# Patient Record
Sex: MALE | Race: OTHER | Employment: UNEMPLOYED | URBAN - METROPOLITAN AREA
[De-identification: names, ages, dates, MRNs, and addresses within clinical notes are randomized per-mention and may not be internally consistent; named-entity substitution may affect disease eponyms.]

---

## 2022-06-27 PROBLEM — Z96.0 URINARY CATHETER IN PLACE: Status: ACTIVE | Noted: 2022-06-27

## 2022-06-27 PROBLEM — N39.0 URINARY TRACT INFECTION WITH HEMATURIA: Status: ACTIVE | Noted: 2022-06-27

## 2022-06-27 PROBLEM — R33.9 URINARY RETENTION: Status: ACTIVE | Noted: 2022-06-27

## 2022-06-27 PROBLEM — R31.9 URINARY TRACT INFECTION WITH HEMATURIA: Status: ACTIVE | Noted: 2022-06-27

## 2023-01-10 PROBLEM — N42.89 PROSTATE MASS: Status: ACTIVE | Noted: 2023-01-10

## 2023-01-10 PROBLEM — R97.20 ELEVATED PROSTATE SPECIFIC ANTIGEN (PSA): Status: ACTIVE | Noted: 2023-01-10

## 2023-01-10 NOTE — H&P (VIEW-ONLY)
[facility]  [address] [facility]    Aníbal Bustamante [address], [address] [facility]  [phone]          Christina Hernandez  : 1975    Chief Complaint   Patient presents with    Follow-up          HPI     Christina Hernandez is a 49 y.o. male followed for BPH and elevated psa. Malay speaking only. Upon chart review, pt has had 3 ER visit in  at [facility] and 1 ER visit in [place] In July for urinary retention. He was started on keflex on  for nitritie positive UA and flomax. Urine was not sent for culture. He reports he has failed 3 VT. Cr 1.06 on 22. No imaging of urinary tract on file. Pt passed VT on 22. PVR 39 cc via US in office today. UA clear. He was continued on flomax 0.4 mg BID. Doing well on this dose. ANGELA anodular prostate. Grandfather was dx w prostate Ca. He is a non smoker. MRI prostate 23 revealed PIRADS 3 lesions x 2, 1 PIRADS 4 lesion, 1 PIRADS 5 lesion. Lab Results   Component Value Date     PSA 9.4 (H) 10/27/2022     PSA 7.8 (H) 2022           Past Medical History:   Diagnosis Date    Asthma     Benign prostatic hyperplasia     Catheter (urine) change required     Migraines      Past Surgical History:   Procedure Laterality Date    CATHETER INSERTION      HERNIA REPAIR      in the testicles    TONSILLECTOMY       Current Outpatient Medications   Medication Sig Dispense Refill    ciprofloxacin (CIPRO) 500 MG tablet Take 1 tablet by mouth 2 times daily for 1 day Begin AM of prostate biopsy. 2 tablet 0    tamsulosin (FLOMAX) 0.4 MG capsule Take 1 capsule by mouth in the morning and at bedtime 180 capsule 3    cephALEXin (KEFLEX) 500 MG capsule PLEASE SEE ATTACHED FOR DETAILED DIRECTIONS (Patient not taking: No sig reported)      diazePAM (VALIUM) 5 MG tablet Take 5 mg by mouth every 8 hours as needed. (Patient not taking: No sig reported)       No current facility-administered medications for this visit.      No Known Allergies  Social History     Socioeconomic History    Marital status:      Spouse name: Not on file    Number of children: Not on file    Years of education: Not on file    Highest education level: Not on file   Occupational History    Not on file   Tobacco Use    Smoking status: Never    Smokeless tobacco: Never   Vaping Use    Vaping Use: Never used   Substance and Sexual Activity    Alcohol use: Never    Drug use: Never    Sexual activity: Not on file     Comment: did not ask   Other Topics Concern    Not on file   Social History Narrative    Not on file     Social Determinants of Health     Financial Resource Strain: Low Risk     Difficulty of Paying Living Expenses: Not hard at all   Food Insecurity: No Food Insecurity    Worried About Running Out of Food in the Last Year: Never true    Ran Out of Food in the Last Year: Never true   Transportation Needs: Not on file   Physical Activity: Not on file   Stress: Not on file   Social Connections: Not on file   Intimate Partner Violence: Not on file   Housing Stability: Unknown    Unable to Pay for Housing in the Last Year: No    Number of [place] in the Last Year: Not on file    Unstable Housing in the Last Year: No     Family History   Problem Relation Age of Onset    Pancreatic Cancer Mother     Heart Attack Father     Cancer Brother        Review of Systems  Constitutional:   Negative for fever. GI:  Negative for nausea. Genitourinary:  Negative for flank pain.     Urinalysis  UA - Dipstick  Results for orders placed or performed in visit on 01/10/23   AMB POC URINALYSIS DIP STICK AUTO W/O MICRO   Result Value Ref Range    Color (UA POC)      Clarity (UA POC)      Glucose, Urine, POC Negative Negative    Bilirubin, Urine, POC Negative Negative    KETONES, Urine, POC Negative Negative    Specific Gravity, Urine, POC 1.020 1.001 - 1.035    Blood (UA POC) Negative Negative    pH, Urine, POC 7.0 4.6 - 8.0    Protein, Urine, POC Negative Negative Urobilinogen, POC 0.2 mg/dL     Nitrite, Urine, POC Negative Negative    Leukocyte Esterase, Urine, POC Negative Negative       There were no vitals taken for this visit. GENERAL: NAD, ALERT, A&O x 3, GAIT NORMAL  CARDIAC: regular rate and rhythm  CHEST AND LUNG: Easy work of breathing, clear to auscultation bilaterally  ABDOMEN: soft, non tender, non distended, + bowel sounds, no organomegaly, no palpable masses  NEUROLOGIC: cranial nerves 2-12 grossly intact           Assessment and Plan    ICD-10-CM    1. Elevated PSA  R97.20 AMB POC URINALYSIS DIP STICK AUTO W/O MICRO     ciprofloxacin (CIPRO) 500 MG tablet      2. Benign prostatic hyperplasia with nocturia  N40.1 AMB POC URINALYSIS DIP STICK AUTO W/O MICRO    R35.1           MRI results were discussed and options reviewed. He will be scheduled for uronav guided prostate biopsy. Potential risks were discussed.

## 2023-01-24 RX ORDER — LORATADINE 10 MG/1
10 CAPSULE, LIQUID FILLED ORAL AS NEEDED
COMMUNITY

## 2023-01-24 RX ORDER — ACETAMINOPHEN, ASPIRIN AND CAFFEINE 250; 250; 65 MG/1; MG/1; MG/1
1 TABLET, FILM COATED ORAL AS NEEDED
Status: ON HOLD | COMMUNITY
End: 2023-02-01 | Stop reason: HOSPADM

## 2023-01-24 NOTE — PERIOP NOTE
Patient verified name and . Order for consent  found in EHR and matches case posting; patient verifies procedure. Completed with [facility]. Type 1 surgery, Phone assessment complete. Orders  received. Labs per surgeon: CBC and BMP DOS  Labs per anesthesia protocol: no additional.    Patient answered medical/surgical history questions at their best of ability. All prior to admission medications documented in [facility]. Patient instructed to take the following medications the day of surgery according to anesthesia guidelines with a small sip of water: Flomax. Hold all vitamins 7 days prior to surgery and NSAIDS 5 days prior to surgery. Prescription meds to hold:Excedrin - 5 days prior to surgery. Patient instructed on the following:    > Arrive at [facility], time of arrival to be called the day before by 1700  > NPO after midnight, unless otherwise indicated, including gum, mints, and ice chips  > Responsible adult must drive patient to the hospital, stay during surgery, and patient will need supervision 24 hours after anesthesia  > Use Antibacterial Soap in shower the night before surgery and on the morning of surgery  > All piercings must be removed prior to arrival.    > Leave all valuables (money and jewelry) at home but bring insurance card and ID on DOS.   > You may be required to pay a deductible or co-pay on the day of your procedure. You can pre-pay by calling [phone] if your surgery is at the [facility] or [phone] if your surgery is at the [facility]. > Do not wear make-up, nail polish, lotions, cologne, perfumes, powders, or oil on skin. Artificial nails are not permitted.

## 2023-01-25 ENCOUNTER — TELEPHONE (OUTPATIENT)
Dept: UROLOGY | Age: 48
End: 2023-01-25

## 2023-02-01 ENCOUNTER — ANESTHESIA (OUTPATIENT)
Dept: SURGERY | Age: 48
End: 2023-02-01

## 2023-02-01 ENCOUNTER — ANESTHESIA EVENT (OUTPATIENT)
Dept: SURGERY | Age: 48
End: 2023-02-01

## 2023-02-01 ENCOUNTER — HOSPITAL ENCOUNTER (OUTPATIENT)
Age: 48
Setting detail: OUTPATIENT SURGERY
Discharge: HOME | End: 2023-02-01
Attending: UROLOGY | Admitting: UROLOGY

## 2023-02-01 VITALS
DIASTOLIC BLOOD PRESSURE: 75 MMHG | TEMPERATURE: 98.2 F | HEART RATE: 69 BPM | HEIGHT: 66 IN | BODY MASS INDEX: 26.22 KG/M2 | OXYGEN SATURATION: 98 % | SYSTOLIC BLOOD PRESSURE: 118 MMHG | WEIGHT: 163.14 LBS | RESPIRATION RATE: 16 BRPM

## 2023-02-01 DIAGNOSIS — N42.89 PROSTATE MASS: ICD-10-CM

## 2023-02-01 DIAGNOSIS — R97.20 ELEVATED PROSTATE SPECIFIC ANTIGEN (PSA): ICD-10-CM

## 2023-02-01 LAB
ANION GAP SERPL CALC-SCNC: 8 MMOL/L (ref 2–11)
BUN SERPL-MCNC: 17 MG/DL (ref 6–23)
CALCIUM SERPL-MCNC: 9.5 MG/DL (ref 8.3–10.4)
CHLORIDE SERPL-SCNC: 106 MMOL/L (ref 101–110)
CO2 SERPL-SCNC: 25 MMOL/L (ref 21–32)
CREAT SERPL-MCNC: 1.4 MG/DL (ref 0.8–1.5)
ERYTHROCYTE [DISTWIDTH] IN BLOOD BY AUTOMATED COUNT: 13.2 % (ref 11.9–14.6)
GLUCOSE SERPL-MCNC: 105 MG/DL (ref 65–100)
HCT VFR BLD AUTO: 51.1 % (ref 41.1–50.3)
HGB BLD-MCNC: 17.7 G/DL (ref 13.6–17.2)
MCH RBC QN AUTO: 29.7 PG (ref 26.1–32.9)
MCHC RBC AUTO-ENTMCNC: 34.6 G/DL (ref 31.4–35)
MCV RBC AUTO: 85.9 FL (ref 82–102)
NRBC # BLD: 0 K/UL (ref 0–0.2)
PLATELET # BLD AUTO: 312 K/UL (ref 150–450)
PMV BLD AUTO: 8.9 FL (ref 9.4–12.3)
POTASSIUM SERPL-SCNC: 4.3 MMOL/L (ref 3.5–5.1)
RBC # BLD AUTO: 5.95 M/UL (ref 4.23–5.6)
SODIUM SERPL-SCNC: 139 MMOL/L (ref 133–143)
WBC # BLD AUTO: 7.7 K/UL (ref 4.3–11.1)

## 2023-02-01 PROCEDURE — 7100000001 HC PACU RECOVERY - ADDTL 15 MIN: Performed by: UROLOGY

## 2023-02-01 PROCEDURE — 2580000003 HC RX 258: Performed by: UROLOGY

## 2023-02-01 PROCEDURE — 88305 TISSUE EXAM BY PATHOLOGIST: CPT

## 2023-02-01 PROCEDURE — 2709999900 HC NON-CHARGEABLE SUPPLY: Performed by: UROLOGY

## 2023-02-01 PROCEDURE — 85027 COMPLETE CBC AUTOMATED: CPT

## 2023-02-01 PROCEDURE — 6360000002 HC RX W HCPCS: Performed by: UROLOGY

## 2023-02-01 PROCEDURE — 2580000003 HC RX 258: Performed by: ANESTHESIOLOGY

## 2023-02-01 PROCEDURE — 6360000002 HC RX W HCPCS: Performed by: NURSE ANESTHETIST, CERTIFIED REGISTERED

## 2023-02-01 PROCEDURE — 80048 BASIC METABOLIC PNL TOTAL CA: CPT

## 2023-02-01 PROCEDURE — 7100000010 HC PHASE II RECOVERY - FIRST 15 MIN: Performed by: UROLOGY

## 2023-02-01 PROCEDURE — 3700000001 HC ADD 15 MINUTES (ANESTHESIA): Performed by: UROLOGY

## 2023-02-01 PROCEDURE — 3600000012 HC SURGERY LEVEL 2 ADDTL 15MIN: Performed by: UROLOGY

## 2023-02-01 PROCEDURE — 7100000000 HC PACU RECOVERY - FIRST 15 MIN: Performed by: UROLOGY

## 2023-02-01 PROCEDURE — 3700000000 HC ANESTHESIA ATTENDED CARE: Performed by: UROLOGY

## 2023-02-01 PROCEDURE — 3600000002 HC SURGERY LEVEL 2 BASE: Performed by: UROLOGY

## 2023-02-01 RX ORDER — SODIUM CHLORIDE 9 MG/ML
INJECTION, SOLUTION INTRAVENOUS PRN
Status: DISCONTINUED | OUTPATIENT
Start: 2023-02-01 | End: 2023-02-01 | Stop reason: HOSPADM

## 2023-02-01 RX ORDER — HYDROMORPHONE HCL 110MG/55ML
0.5 PATIENT CONTROLLED ANALGESIA SYRINGE INTRAVENOUS EVERY 5 MIN PRN
Status: DISCONTINUED | OUTPATIENT
Start: 2023-02-01 | End: 2023-02-01 | Stop reason: HOSPADM

## 2023-02-01 RX ORDER — PROPOFOL 10 MG/ML
INJECTION, EMULSION INTRAVENOUS PRN
Status: DISCONTINUED | OUTPATIENT
Start: 2023-02-01 | End: 2023-02-01 | Stop reason: SDUPTHER

## 2023-02-01 RX ORDER — FENTANYL CITRATE 50 UG/ML
100 INJECTION, SOLUTION INTRAMUSCULAR; INTRAVENOUS
Status: DISCONTINUED | OUTPATIENT
Start: 2023-02-01 | End: 2023-02-01 | Stop reason: HOSPADM

## 2023-02-01 RX ORDER — MIDAZOLAM HYDROCHLORIDE 2 MG/2ML
2 INJECTION, SOLUTION INTRAMUSCULAR; INTRAVENOUS
Status: DISCONTINUED | OUTPATIENT
Start: 2023-02-01 | End: 2023-02-01 | Stop reason: HOSPADM

## 2023-02-01 RX ORDER — SODIUM CHLORIDE 0.9 % (FLUSH) 0.9 %
5-40 SYRINGE (ML) INJECTION PRN
Status: DISCONTINUED | OUTPATIENT
Start: 2023-02-01 | End: 2023-02-01 | Stop reason: HOSPADM

## 2023-02-01 RX ORDER — SODIUM CHLORIDE 0.9 % (FLUSH) 0.9 %
5-40 SYRINGE (ML) INJECTION EVERY 12 HOURS SCHEDULED
Status: DISCONTINUED | OUTPATIENT
Start: 2023-02-01 | End: 2023-02-01 | Stop reason: HOSPADM

## 2023-02-01 RX ORDER — SODIUM CHLORIDE, SODIUM LACTATE, POTASSIUM CHLORIDE, CALCIUM CHLORIDE 600; 310; 30; 20 MG/100ML; MG/100ML; MG/100ML; MG/100ML
INJECTION, SOLUTION INTRAVENOUS CONTINUOUS
Status: DISCONTINUED | OUTPATIENT
Start: 2023-02-01 | End: 2023-02-01 | Stop reason: HOSPADM

## 2023-02-01 RX ORDER — PROCHLORPERAZINE EDISYLATE 5 MG/ML
5 INJECTION INTRAMUSCULAR; INTRAVENOUS
Status: DISCONTINUED | OUTPATIENT
Start: 2023-02-01 | End: 2023-02-01 | Stop reason: HOSPADM

## 2023-02-01 RX ORDER — LIDOCAINE HYDROCHLORIDE 10 MG/ML
1 INJECTION, SOLUTION INFILTRATION; PERINEURAL
Status: DISCONTINUED | OUTPATIENT
Start: 2023-02-01 | End: 2023-02-01 | Stop reason: HOSPADM

## 2023-02-01 RX ORDER — OXYCODONE HYDROCHLORIDE 5 MG/1
5 TABLET ORAL
Status: DISCONTINUED | OUTPATIENT
Start: 2023-02-01 | End: 2023-02-01 | Stop reason: HOSPADM

## 2023-02-01 RX ADMIN — CEFTRIAXONE 1000 MG: 1 INJECTION, POWDER, FOR SOLUTION INTRAMUSCULAR; INTRAVENOUS at 10:52

## 2023-02-01 RX ADMIN — PROPOFOL 50 MG: 10 INJECTION, EMULSION INTRAVENOUS at 10:54

## 2023-02-01 RX ADMIN — PROPOFOL 50 MG: 10 INJECTION, EMULSION INTRAVENOUS at 10:53

## 2023-02-01 RX ADMIN — SODIUM CHLORIDE, POTASSIUM CHLORIDE, SODIUM LACTATE AND CALCIUM CHLORIDE: 600; 310; 30; 20 INJECTION, SOLUTION INTRAVENOUS at 10:16

## 2023-02-01 ASSESSMENT — PAIN - FUNCTIONAL ASSESSMENT: PAIN_FUNCTIONAL_ASSESSMENT: NONE - DENIES PAIN

## 2023-02-01 NOTE — PERIOP NOTE
Pt and wife given discharge instructions at bedside, both verbalize understanding. All questions answered.

## 2023-02-01 NOTE — OP NOTE
Operative Note                Patient: Alma Delia Matthew [id number]    Date of Surgery: 02/01/23    Preoperative Diagnosis: Elevated psa and prostate lesion(s) on MRI imaging    Postoperative Diagnosis:  same    Surgeon(s) and Role:     * Paulino Gonzales MD - Primary     Anesthesia:  MAC     Procedure: Procedure(s):  Bowen Child MRI fused TRUS prostate biopsy     RISKS DISCUSSION:     Discussed the risk of surgery including infection, hematoma, bleeding, and the risks of general anesthetic. The patient understands the risks, any and all questions were answered to the patient's satisfaction and they freely signed the consent for operation. URONAV MRI FUSED TRANSRECTAL ULTRASOUND GUIDED BIOPSY OF THE PROSTATE    All risks, benefits and alternatives were again reviewed and he is willing to proceed at this time. The patient was placed in the left lateral decubitus position. I then inserted the transrectal ultrasound probe into the rectum. The prostate was visualized. The prostate appeared homogenous in appearance. Ultrasonographic sweep of the prostate was performed to obtain images to link to MRI via URONAV. There were 4 regions of interest based upon MRI imaging (ROI1=midgland, ROI2=R apex anterior, ROI3=R mid gland, ROI4=R apex posterior). 5 biopsies of regions of interest were then obtained using the ultrasound images for guidance that had been linked to the previous MRI using Uronav. I then performed 6 needle core biopsies using a standard sextant biopsy format with traditional ultrasound images for guidance. The ultrasound probe was removed. The patient tolerated the procedure well.       PROSTATE VOLUME:  66 gr + prominent median lobe    Estimated Blood Loss:  minimal    Specimens: prostate biopsies             Drains: none                 Implants: * No implants in log *           Signed By: Vance Aguirre MD              Specimens:   ID Type Source Tests Collected by Time Destination A : RB Tissue Prostate SURGICAL PATHOLOGY Jonnie Fry MD 2/1/2023 1040    B : RM Tissue Prostate SURGICAL PATHOLOGY Jonnie Fry MD 2/1/2023 1040    C : RA Tissue Prostate SURGICAL PATHOLOGY Jonnie Fry MD 2/1/2023 1040    D : LB Tissue Prostate SURGICAL PATHOLOGY Jonnie Fry MD 2/1/2023 1040    E : LM Tissue Prostate SURGICAL PATHOLOGY Jonnie Fry MD 2/1/2023 1040    F : LA Tissue Prostate SURGICAL PATHOLOGY Jnonie Fry MD 2/1/2023 1040    G : YOLANDA 1-1 Tissue Prostate SURGICAL PATHOLOGY Jonnie Fry MD 2/1/2023 1041    H : YOLANDA 1-2 Tissue Prostate SURGICAL PATHOLOGY Jonnie Fry MD 2/1/2023 1041    I : YOLANDA 2-1 Tissue Prostate SURGICAL PATHOLOGY Jonnie Fry MD 2/1/2023 1041    J : YOLANDA 3-1 Tissue Prostate SURGICAL PATHOLOGY Jonnie Fry MD 2/1/2023 1041    K : YOLANDA 4-1 Tissue Prostate SURGICAL PATHOLOGY Jonnie Fry MD 2/1/2023 1042

## 2023-02-01 NOTE — ANESTHESIA PRE PROCEDURE
Department of Anesthesiology  Preprocedure Note       Name:  Bob Harmon   Age:  49 y.o.  :  1975                                          MRN:  [id number]         Date:  2023      Surgeon: Nereida Alberts):  Timothy Norwood MD    Procedure: Procedure(s):  PROSTATE BIOPSY FUSION    Medications prior to admission:   Prior to Admission medications    Medication Sig Start Date End Date Taking?  Authorizing Provider   aspirin-acetaminophen-caffeine (EXCEDRIN MIGRAINE) 955-192-81 MG per tablet Take 1 tablet by mouth as needed for Headaches   Yes Historical Provider, MD   loratadine (CLARITIN) 10 MG capsule Take 10 mg by mouth as needed   Yes Historical Provider, MD   tamsulosin (FLOMAX) 0.4 MG capsule Take 1 capsule by mouth in the morning and at bedtime 10/27/22   TRAN Hewitt - CNP       Current medications:    Current Facility-Administered Medications   Medication Dose Route Frequency Provider Last Rate Last Admin    lidocaine 1 % injection 1 mL  1 mL IntraDERmal Once PRN Adriano Ignacio MD        fentaNYL (SUBLIMAZE) injection 100 mcg  100 mcg IntraVENous Once PRN Adriano Ignacio MD        lactated ringers IV soln infusion   IntraVENous Continuous Adriano Ignacio MD        sodium chloride flush 0.9 % injection 5-40 mL  5-40 mL IntraVENous 2 times per day Adriano Ignacio MD        sodium chloride flush 0.9 % injection 5-40 mL  5-40 mL IntraVENous PRN Adriano Ignacio MD        0.9 % sodium chloride infusion   IntraVENous PRN Adriano Ignacio MD        midazolam PF (VERSED) injection 2 mg  2 mg IntraVENous Once PRN Adriano Ignacio MD        cefTRIAXone (ROCEPHIN) 1,000 mg in sodium chloride 0.9 % 50 mL IVPB (mini-bag)  1,000 mg IntraVENous On Call to [address] MD Damien           Allergies:  No Known Allergies    Problem List:    Patient Active Problem List   Diagnosis Code    Urinary catheter in place Z96.0    Urinary retention R33.9    Urinary tract infection with hematuria N39.0, R31.9    Elevated prostate specific antigen (PSA) R97.20    Prostate mass N42.89       Past Medical History:        Diagnosis Date    Asthma     no recent episodes    Benign prostatic hyperplasia     Catheter (urine) change required     pt no longer has a catheter    Elevated prostate specific antigen (PSA)     Migraines     Seasonal allergies     Uses Wolof as primary spoken language        Past Surgical History:        Procedure Laterality Date    CATHETER INSERTION  2022    placed and removed    HERNIA REPAIR Left     in the testicles    TONSILLECTOMY         Social History:    Social History     Tobacco Use    Smoking status: Never    Smokeless tobacco: Never   Substance Use Topics    Alcohol use: Never                                Counseling given: Not Answered      Vital Signs (Current):   Vitals:    01/24/23 1330 02/01/23 0939   BP:  116/75   Pulse:  74   Resp:  16   Temp:  98.2 °F (36.8 °C)   TempSrc:  Infrared   SpO2:  99%   Weight: 163 lb 2.3 oz (74 kg)    Height: 5' 6\" (1.676 m)                                               BP Readings from Last 3 Encounters:   02/01/23 116/75   09/21/22 120/78   06/27/22 106/72       NPO Status: Time of last liquid consumption: 0000                        Time of last solid consumption: 0000                        Date of last liquid consumption: 01/31/23                        Date of last solid food consumption: 01/31/23    BMI:   Wt Readings from Last 3 Encounters:   01/24/23 163 lb 2.3 oz (74 kg)   01/04/23 167 lb (75.8 kg)   09/21/22 167 lb (75.8 kg)     Body mass index is 26.33 kg/m². CBC: No results found for: WBC, RBC, HGB, HCT, MCV, RDW, PLT    CMP: No results found for: NA, K, CL, CO2, BUN, CREATININE, GFRAA, AGRATIO, LABGLOM, GLUCOSE, GLU, PROT, CALCIUM, BILITOT, ALKPHOS, AST, ALT    POC Tests: No results for input(s): POCGLU, POCNA, POCK, POCCL, POCBUN, POCHEMO, POCHCT in the last 72 hours.     Coags: No results found for: PROTIME, INR, APTT    HCG (If Applicable): No results found for: PREGTESTUR, PREGSERUM, HCG, HCGQUANT     ABGs: No results found for: PHART, PO2ART, FEP4AXD, GCV2YIQ, BEART, G1TOJFDP     Type & Screen (If Applicable):  No results found for: LABABO, LABRH    Drug/Infectious Status (If Applicable):  No results found for: HIV, HEPCAB    COVID-19 Screening (If Applicable): No results found for: COVID19        Anesthesia Evaluation    Airway: Mallampati: I  TM distance: >3 FB   Neck ROM: full  Mouth opening: > = 3 FB   Dental: normal exam         Pulmonary:normal exam  breath sounds clear to auscultation  (+) asthma (well controlled, rare sxs):                            Cardiovascular:  Exercise tolerance: good (>4 METS),   (+) dysrhythmias (occ palpitations, takes propranolol prn, no issues for 1 yr):,         Rhythm: regular  Rate: normal                    Neuro/Psych:   (+) headaches: migraine headaches,             GI/Hepatic/Renal:             Endo/Other:    (+) malignancy/cancer (prostate). Abdominal:             Vascular: Other Findings:           Anesthesia Plan      TIVA     ASA 2     (IPad .)  Induction: intravenous. Anesthetic plan and risks discussed with patient.                         Zachery Dent MD   2/1/2023

## 2023-02-01 NOTE — PROGRESS NOTES
Patient speaks Sinhala as their preferred language for their healthcare communication. If there are technical problems using the AMN mobil unit, please contact Language Services for interpretation at:    Senior Domingo -Navigator ([phone])  General phone: [phone] ( [phone])  Email: [email or web address]    Please always document the use of interpreting services (name and/or 's ID number) in your clinical notes. Our interpreters are available for team members working with limited  English proficient (LEP) patients remotely, in person (if needed for special cases), as phone or video interpreters on the AMN Mobil units.         Thank you,        Vicky GONZALEZ  Senior /Navigator

## 2023-02-01 NOTE — INTERVAL H&P NOTE
Update History & Physical    The patient's History and Physical of January 10, 2023 was reviewed with the patient and I examined the patient. There was no change. The surgical site was confirmed by the patient and me.     Plan: The risks, benefits, expected outcome, and alternative to the recommended procedure have been discussed with the patient. Patient understands and wants to proceed with the procedure.     Electronically signed by ALLAN FARR MD on 2/1/2023 at 9:26 AM

## 2023-03-14 ENCOUNTER — OFFICE VISIT (OUTPATIENT)
Dept: UROLOGY | Age: 48
End: 2023-03-14

## 2023-03-14 DIAGNOSIS — N40.1 BENIGN PROSTATIC HYPERPLASIA WITH NOCTURIA: ICD-10-CM

## 2023-03-14 DIAGNOSIS — R97.20 ELEVATED PSA: Primary | ICD-10-CM

## 2023-03-14 DIAGNOSIS — R35.1 BENIGN PROSTATIC HYPERPLASIA WITH NOCTURIA: ICD-10-CM

## 2023-03-14 LAB
BILIRUBIN, URINE, POC: NEGATIVE
BLOOD URINE, POC: NORMAL
GLUCOSE URINE, POC: NEGATIVE
KETONES, URINE, POC: NEGATIVE
LEUKOCYTE ESTERASE, URINE, POC: NEGATIVE
NITRITE, URINE, POC: NEGATIVE
PH, URINE, POC: 5.5 (ref 4.6–8)
PROTEIN,URINE, POC: NEGATIVE
SPECIFIC GRAVITY, URINE, POC: 1 (ref 1–1.03)
URINALYSIS CLARITY, POC: NORMAL
URINALYSIS COLOR, POC: NORMAL
UROBILINOGEN, POC: NORMAL

## 2023-03-14 PROCEDURE — 81003 URINALYSIS AUTO W/O SCOPE: CPT | Performed by: UROLOGY

## 2023-03-14 PROCEDURE — 99213 OFFICE O/P EST LOW 20 MIN: CPT | Performed by: UROLOGY

## 2023-03-14 ASSESSMENT — ENCOUNTER SYMPTOMS: NAUSEA: 0

## 2023-03-14 NOTE — PROGRESS NOTES
[facility]  [address] [facility] [address], [address] [facility]  [phone]          Soledad López  : 1975    Chief Complaint   Patient presents with    Follow-up          HPI     Soledad López is a 49 y.o. male followed for BPH and elevated psa. Romansh speaking only. Upon chart review, pt has had 3 ER visit in  at [facility] and 1 ER visit in [place] In July for urinary retention. He was started on keflex on  for nitritie positive UA and flomax. Urine was not sent for culture. He reports he has failed 3 VT. Cr 1.06 on 22. No imaging of urinary tract on file. Pt passed VT on 22. PVR 39 cc via US in office today. UA clear. He was continued on flomax 0.4 mg BID. Doing well on this dose. ANGELA anodular prostate. Grandfather was dx w prostate Ca. He is a non smoker. MRI prostate 23 revealed PIRADS 3 lesions x 2, 1 PIRADS 4 lesion, 1 PIRADS 5 lesion. Helyn Spurling guided prostate biopsy 23 revealed NO prostate cancer. All cores were benign. Gland was 66 gr with a median lobe.             Past Medical History:   Diagnosis Date    Asthma     no recent episodes    Benign prostatic hyperplasia     Catheter (urine) change required     pt no longer has a catheter    Elevated prostate specific antigen (PSA)     Migraines     Seasonal allergies     Uses Romansh as primary spoken language      Past Surgical History:   Procedure Laterality Date    CATHETER INSERTION      placed and removed    HERNIA REPAIR Left     in the testicles    PROSTATE BIOPSY N/A 2023    PROSTATE BIOPSY FUSION performed by Bryson Hart MD at [facility] MAIN OR    TONSILLECTOMY       Current Outpatient Medications   Medication Sig Dispense Refill    loratadine (CLARITIN) 10 MG capsule Take 10 mg by mouth as needed      tamsulosin (FLOMAX) 0.4 MG capsule Take 1 capsule by mouth in the morning and at bedtime 180 capsule 3     No current facility-administered medications for this visit. No Known Allergies  Social History     Socioeconomic History    Marital status:      Spouse name: Not on file    Number of children: Not on file    Years of education: Not on file    Highest education level: Not on file   Occupational History    Not on file   Tobacco Use    Smoking status: Never    Smokeless tobacco: Never   Vaping Use    Vaping Use: Never used   Substance and Sexual Activity    Alcohol use: Never    Drug use: Never    Sexual activity: Not on file     Comment: did not ask   Other Topics Concern    Not on file   Social History Narrative    Not on file     Social Determinants of Health     Financial Resource Strain: Low Risk     Difficulty of Paying Living Expenses: Not hard at all   Food Insecurity: No Food Insecurity    Worried About Running Out of Food in the Last Year: Never true    Ran Out of Food in the Last Year: Never true   Transportation Needs: Not on file   Physical Activity: Not on file   Stress: Not on file   Social Connections: Not on file   Intimate Partner Violence: Not on file   Housing Stability: Unknown    Unable to Pay for Housing in the Last Year: No    Number of [place] in the Last Year: Not on file    Unstable Housing in the Last Year: No     Family History   Problem Relation Age of Onset    Pancreatic Cancer Mother     Heart Attack Father     Cancer Brother        Review of Systems  Constitutional:   Negative for fever. GI:  Negative for nausea. Genitourinary:  Negative for flank pain.     Urinalysis  UA - Dipstick  Results for orders placed or performed in visit on 03/14/23   AMB POC URINALYSIS DIP STICK AUTO W/O MICRO   Result Value Ref Range    Color (UA POC)      Clarity (UA POC)      Glucose, Urine, POC Negative Negative    Bilirubin, Urine, POC Negative Negative    KETONES, Urine, POC Negative Negative    Specific Gravity, Urine, POC 1.005 1.001 - 1.035    Blood (UA POC) Trace-intact Negative    pH, Urine, POC 5.5 4.6 - 8.0    Protein, Urine, POC Negative Negative    Urobilinogen, POC 0.2 mg/dL     Nitrite, Urine, POC Negative Negative    Leukocyte Esterase, Urine, POC Negative Negative       There were no vitals taken for this visit. GENERAL: NAD, ALERT, A&O x 3, GAIT NORMAL  LUNGS: easy work of breathing  ABDOMEN: soft, non tender  NEUROLOGIC: cranial nerves 2-12 grossly intact           Assessment and Plan    ICD-10-CM    1. Elevated PSA  R97.20 AMB POC URINALYSIS DIP STICK AUTO W/O MICRO     PSA, Diagnostic      2. Benign prostatic hyperplasia with nocturia  N40.1 AMB POC URINALYSIS DIP STICK AUTO W/O MICRO    R35.1           Biopsy results were reviewed via . I will see him back in 3 mo with psa. I wonder if had an infection at the end of '22 as LUTS have improved since. IF psa doesn't fall, we may consider finasteride considering the large size of his gland.

## 2023-06-06 DIAGNOSIS — R97.20 ELEVATED PSA: ICD-10-CM

## 2023-06-07 LAB — PSA SERPL-MCNC: 9 NG/ML

## 2023-06-21 ENCOUNTER — TELEPHONE (OUTPATIENT)
Dept: PRIMARY CARE CLINIC | Facility: CLINIC | Age: 48
End: 2023-06-21

## 2023-06-22 ENCOUNTER — OFFICE VISIT (OUTPATIENT)
Dept: PRIMARY CARE CLINIC | Facility: CLINIC | Age: 48
End: 2023-06-22

## 2023-06-22 VITALS
WEIGHT: 169 LBS | RESPIRATION RATE: 18 BRPM | HEART RATE: 74 BPM | SYSTOLIC BLOOD PRESSURE: 131 MMHG | BODY MASS INDEX: 27.16 KG/M2 | TEMPERATURE: 98.3 F | DIASTOLIC BLOOD PRESSURE: 82 MMHG | OXYGEN SATURATION: 100 % | HEIGHT: 66 IN

## 2023-06-22 DIAGNOSIS — G43.719 INTRACTABLE CHRONIC MIGRAINE WITHOUT AURA AND WITHOUT STATUS MIGRAINOSUS: Primary | ICD-10-CM

## 2023-06-22 DIAGNOSIS — H61.21 IMPACTED CERUMEN OF RIGHT EAR: ICD-10-CM

## 2023-06-22 PROCEDURE — 99213 OFFICE O/P EST LOW 20 MIN: CPT | Performed by: NURSE PRACTITIONER

## 2023-06-22 PROCEDURE — 69210 REMOVE IMPACTED EAR WAX UNI: CPT | Performed by: NURSE PRACTITIONER

## 2023-06-22 RX ORDER — PROPRANOLOL HCL 60 MG
60 CAPSULE, EXTENDED RELEASE 24HR ORAL DAILY
Qty: 90 CAPSULE | Refills: 0 | Status: SHIPPED | OUTPATIENT
Start: 2023-06-22

## 2023-06-22 RX ORDER — ONDANSETRON 4 MG/1
4 TABLET, ORALLY DISINTEGRATING ORAL 3 TIMES DAILY PRN
Qty: 21 TABLET | Refills: 0 | Status: SHIPPED | OUTPATIENT
Start: 2023-06-22

## 2023-06-22 SDOH — ECONOMIC STABILITY: INCOME INSECURITY: HOW HARD IS IT FOR YOU TO PAY FOR THE VERY BASICS LIKE FOOD, HOUSING, MEDICAL CARE, AND HEATING?: NOT HARD AT ALL

## 2023-06-22 SDOH — ECONOMIC STABILITY: FOOD INSECURITY: WITHIN THE PAST 12 MONTHS, THE FOOD YOU BOUGHT JUST DIDN'T LAST AND YOU DIDN'T HAVE MONEY TO GET MORE.: NEVER TRUE

## 2023-06-22 SDOH — ECONOMIC STABILITY: HOUSING INSECURITY
IN THE LAST 12 MONTHS, WAS THERE A TIME WHEN YOU DID NOT HAVE A STEADY PLACE TO SLEEP OR SLEPT IN A SHELTER (INCLUDING NOW)?: NO

## 2023-06-22 SDOH — ECONOMIC STABILITY: FOOD INSECURITY: WITHIN THE PAST 12 MONTHS, YOU WORRIED THAT YOUR FOOD WOULD RUN OUT BEFORE YOU GOT MONEY TO BUY MORE.: NEVER TRUE

## 2023-06-22 ASSESSMENT — PATIENT HEALTH QUESTIONNAIRE - PHQ9
1. LITTLE INTEREST OR PLEASURE IN DOING THINGS: 0
SUM OF ALL RESPONSES TO PHQ QUESTIONS 1-9: 0
SUM OF ALL RESPONSES TO PHQ9 QUESTIONS 1 & 2: 0
2. FEELING DOWN, DEPRESSED OR HOPELESS: 0
SUM OF ALL RESPONSES TO PHQ QUESTIONS 1-9: 0

## 2023-06-22 ASSESSMENT — ENCOUNTER SYMPTOMS
NAUSEA: 1
PHOTOPHOBIA: 1
ABDOMINAL PAIN: 0
SINUS PRESSURE: 0
BLURRED VISION: 0
SHORTNESS OF BREATH: 0
EYE PAIN: 0
VOMITING: 1

## 2023-06-22 NOTE — CONSULTS
Session ID: [id number]  Request ID: [id number]  Language: Kinyarwanda  Status: Fulfilled   ID: #[id number]   Name: Chio Aaron

## 2023-06-22 NOTE — PROGRESS NOTES
Awais Aquino (: 1975) Interpretation provided by [facility] Veronica Perdomo #[id number]    Pt presents today for migraine HA. Pt states has had migraines since childhood. Had seen a MD in [facility] for this issue and was given rx for Codeine/acetaminophen or Ergot. States has HA everyday, never goes away. There are things that increase HA but states that he has gotten rid of these things that make the HA worse. Has been taking these medications but HA does not fully dissipate, HA does improve. HA occurs on left or right side of head. States w/ vomiting pain increases. The last 3 months the episodes have increased. Has stopped eating sweets. With migraine pain increases to 10. Ran out of medications for acute migraine 8 days ago. Is currently taking Propranolol 40mg every other day. Had CT head 2 yrs ago w/ nml results per pt. Pt was able to pull up results from CT 21: no lesions, aneurysm, or atherosclerosis, stenosis, occlusion. Has tried NSAIDs, acetaminophen, and Triptans without relief. Migraine   The problem occurs daily. The pain quality is similar to prior headaches. The pain is at a severity of 6/10. Associated symptoms include nausea, phonophobia, photophobia and vomiting (vomitting occurs \"only occassionally\"). Pertinent negatives include no abdominal pain, blurred vision, ear pain, eye pain, fever, hearing loss, loss of balance, numbness, sinus pressure, tingling or tinnitus. He has tried NSAIDs for the symptoms. His past medical history is significant for migraine headaches. There is no history of cancer, hypertension or recent head traumas.       Chief Complaint   Patient presents with    Migraine     Patient states that he's been having migrainse since he was little, he used to take prescriptions in his country [facility] but when he came here he was able to control the headaches with excedrin, tylenol and advil for migraines but now those haven't help recently        Reviewed and updated

## 2023-07-24 ENCOUNTER — OFFICE VISIT (OUTPATIENT)
Dept: PRIMARY CARE CLINIC | Facility: CLINIC | Age: 48
End: 2023-07-24

## 2023-07-24 VITALS
SYSTOLIC BLOOD PRESSURE: 116 MMHG | HEART RATE: 85 BPM | OXYGEN SATURATION: 98 % | DIASTOLIC BLOOD PRESSURE: 74 MMHG | WEIGHT: 168 LBS | BODY MASS INDEX: 27 KG/M2 | RESPIRATION RATE: 18 BRPM | TEMPERATURE: 98.3 F | HEIGHT: 66 IN

## 2023-07-24 DIAGNOSIS — Z59.89 UNINSURED: ICD-10-CM

## 2023-07-24 DIAGNOSIS — G43.719 INTRACTABLE CHRONIC MIGRAINE WITHOUT AURA AND WITHOUT STATUS MIGRAINOSUS: Primary | ICD-10-CM

## 2023-07-24 PROCEDURE — 99214 OFFICE O/P EST MOD 30 MIN: CPT | Performed by: NURSE PRACTITIONER

## 2023-07-24 RX ORDER — BUTALBITAL, ACETAMINOPHEN AND CAFFEINE 50; 325; 40 MG/1; MG/1; MG/1
1 TABLET ORAL EVERY 4 HOURS PRN
Qty: 30 TABLET | Refills: 0 | Status: SHIPPED | OUTPATIENT
Start: 2023-07-24

## 2023-07-24 SDOH — ECONOMIC STABILITY - INCOME SECURITY: OTHER PROBLEMS RELATED TO HOUSING AND ECONOMIC CIRCUMSTANCES: Z59.89

## 2023-07-24 ASSESSMENT — ENCOUNTER SYMPTOMS
COUGH: 0
EYE PAIN: 0
SINUS PAIN: 0
CONSTIPATION: 0
RHINORRHEA: 0
SHORTNESS OF BREATH: 0
ABDOMINAL PAIN: 0
NAUSEA: 1
DIARRHEA: 0
VOMITING: 0
SORE THROAT: 0
BACK PAIN: 0

## 2023-07-24 NOTE — PROGRESS NOTES
[facility]  Tel# [phone]  Fax# [phone]       Lili Mendez, [place], [facility]  Family Nurse Practitioner            Date of Visit: 2023     Harvey Cormier (: 1975) is a 51 y.o. male  established patient, here for evaluation of the following chief complaint(s):    Chief Complaint   Patient presents with    Follow-up     Patient presents for follow up on his migraines, still continue migraine headaches constantly, Patient states that propranolol doesn't work for him would like to see if med can be changed. Patient Care Team:  TRAN Yang NP as PCP - General (Nurse Practitioner)  TRAN Yang NP as PCP - Empaneled Provider         History of Present Illness    [address] #[id number]        Presents here for follow up on headaches. Headache/Migraine  Chronic  Started in childhood, seeing a doctor in [place]. LOV with PCP Evalene Severin, CNP on 2023, pt was prescribed Ergotamine  mg SL prn, propranolol 60 mg ER 1 cap oral daily,and ondansetropn 4 mg oral as needed for nausea. Pt states he has been taking Propranolol 60 mg. Denies taking Ergotamine, denies recognizing the name. Frequency: states he has migraine every day lasting all day. States the only medication that has helped is strong pain killers. States medications here  in the [address] has not helped. In [place], meds called Fencafen (caffeine, ergotamine) and Nodol Forte (acetaminiphen 325 mg + codeine 30 mg)      Diet: B - bread, arepa (bread with cheese)          L - pork, rice, or chicken, veges, soup, or beef          D - bread, mostly small portion like a snack  Fluid intake: 2L of water           HCM    /Eye exam: due, 2022 here in the [address] There is no immunization history on file for this patient.          Patient Active Problem List   Diagnosis    Urinary catheter in place    Urinary retention    Urinary tract infection

## 2023-08-07 ENCOUNTER — OFFICE VISIT (OUTPATIENT)
Dept: PRIMARY CARE CLINIC | Facility: CLINIC | Age: 48
End: 2023-08-07

## 2023-08-07 VITALS
WEIGHT: 171 LBS | HEIGHT: 66 IN | SYSTOLIC BLOOD PRESSURE: 130 MMHG | DIASTOLIC BLOOD PRESSURE: 82 MMHG | TEMPERATURE: 98.9 F | OXYGEN SATURATION: 96 % | HEART RATE: 88 BPM | BODY MASS INDEX: 27.48 KG/M2 | RESPIRATION RATE: 18 BRPM

## 2023-08-07 DIAGNOSIS — G43.719 INTRACTABLE CHRONIC MIGRAINE WITHOUT AURA AND WITHOUT STATUS MIGRAINOSUS: Primary | ICD-10-CM

## 2023-08-07 PROBLEM — Z96.0 URINARY CATHETER IN PLACE: Status: RESOLVED | Noted: 2022-06-27 | Resolved: 2023-08-07

## 2023-08-07 PROBLEM — R31.9 URINARY TRACT INFECTION WITH HEMATURIA: Status: RESOLVED | Noted: 2022-06-27 | Resolved: 2023-08-07

## 2023-08-07 PROBLEM — N39.0 URINARY TRACT INFECTION WITH HEMATURIA: Status: RESOLVED | Noted: 2022-06-27 | Resolved: 2023-08-07

## 2023-08-07 PROCEDURE — 99213 OFFICE O/P EST LOW 20 MIN: CPT | Performed by: NURSE PRACTITIONER

## 2023-08-07 RX ORDER — PROPRANOLOL HYDROCHLORIDE 80 MG/1
80 CAPSULE, EXTENDED RELEASE ORAL DAILY
Qty: 30 CAPSULE | Refills: 1 | Status: SHIPPED | OUTPATIENT
Start: 2023-08-07

## 2023-08-07 RX ORDER — BUTALBITAL, ACETAMINOPHEN AND CAFFEINE 50; 325; 40 MG/1; MG/1; MG/1
1 TABLET ORAL EVERY 4 HOURS PRN
Qty: 30 TABLET | Refills: 0 | Status: SHIPPED | OUTPATIENT
Start: 2023-08-07

## 2023-08-07 ASSESSMENT — ENCOUNTER SYMPTOMS
VISUAL CHANGE: 0
BLURRED VISION: 0
ABDOMINAL PAIN: 0
NAUSEA: 1
SHORTNESS OF BREATH: 1
COUGH: 0
EYE PAIN: 0
VOMITING: 0
PHOTOPHOBIA: 1
EYE REDNESS: 0

## 2023-08-07 NOTE — PROGRESS NOTES
Génesislady Nolan (: 1975) Interpretation provided by [facility] Marie Gamez #[id number]     Has been taking Fioricet that was prescribed last ov and states that this has helped. States the Fioricet calms down the symptoms but does not completely make the symptoms go away. Taking Fioricet in the evening, every day. Gets relief w/ Fioricet for 3-4 hrs. Has migraines every day, some days are worse than others. Rates pain 5-6 out of 10. Has had to use Zofran twice and nausea resolved. Using mask over nose and mouth to help adjust. This yr migraines have been worse than last yr. Migraine   This is a chronic problem. The problem has been gradually improving. The pain is located in the Bilateral region. Radiates to: radiates to the back of the head. The pain quality is similar to prior headaches. The pain is at a severity of 5/10. Associated symptoms include nausea, phonophobia and photophobia. Pertinent negatives include no abdominal pain, blurred vision, coughing, dizziness, ear pain, eye pain, eye redness, fever, hearing loss, loss of balance, tinnitus, visual change, vomiting, weakness or weight loss. Exacerbated by: chocolate, salsas. His past medical history is significant for migraine headaches. There is no history of recent head traumas. Chief Complaint   Patient presents with    Follow-up     Patient presents for a follow up, states that he's been taking fioricet, it helps him a little, eases the pain but it won't go away completely, it makes feel sleepy. Pt also taking otc combination aspirin, tylenol and caffeine         Reviewed and updated this visit by provider:  Tobacco  Allergies  Meds  Problems  Med Hx  Surg Hx  Fam Hx           Immunizations:  Immunization status: up to date and documented. Review of Systems:   Review of Systems   Constitutional:  Negative for chills, fever and weight loss. HENT:  Negative for ear pain, hearing loss and tinnitus.     Eyes:  Positive for

## 2023-09-07 ENCOUNTER — OFFICE VISIT (OUTPATIENT)
Dept: PRIMARY CARE CLINIC | Facility: CLINIC | Age: 48
End: 2023-09-07

## 2023-09-07 VITALS
WEIGHT: 168 LBS | TEMPERATURE: 99.1 F | DIASTOLIC BLOOD PRESSURE: 71 MMHG | OXYGEN SATURATION: 97 % | HEART RATE: 82 BPM | SYSTOLIC BLOOD PRESSURE: 114 MMHG | RESPIRATION RATE: 18 BRPM | HEIGHT: 66 IN | BODY MASS INDEX: 27 KG/M2

## 2023-09-07 DIAGNOSIS — G43.719 INTRACTABLE CHRONIC MIGRAINE WITHOUT AURA AND WITHOUT STATUS MIGRAINOSUS: Primary | ICD-10-CM

## 2023-09-07 DIAGNOSIS — N40.1 BENIGN PROSTATIC HYPERPLASIA WITH NOCTURIA: ICD-10-CM

## 2023-09-07 DIAGNOSIS — R35.1 BENIGN PROSTATIC HYPERPLASIA WITH NOCTURIA: ICD-10-CM

## 2023-09-07 DIAGNOSIS — Z59.89 UNINSURED: ICD-10-CM

## 2023-09-07 PROBLEM — Z59.71 UNINSURED: Status: ACTIVE | Noted: 2023-09-07

## 2023-09-07 PROCEDURE — 99213 OFFICE O/P EST LOW 20 MIN: CPT | Performed by: NURSE PRACTITIONER

## 2023-09-07 RX ORDER — PROPRANOLOL HYDROCHLORIDE 120 MG/1
120 CAPSULE, EXTENDED RELEASE ORAL DAILY
Qty: 30 CAPSULE | Refills: 1 | Status: SHIPPED | OUTPATIENT
Start: 2023-09-07

## 2023-09-07 RX ORDER — TAMSULOSIN HYDROCHLORIDE 0.4 MG/1
0.4 CAPSULE ORAL 2 TIMES DAILY
Qty: 180 CAPSULE | Refills: 0 | Status: SHIPPED | OUTPATIENT
Start: 2023-09-07

## 2023-09-07 RX ORDER — FINASTERIDE 5 MG/1
5 TABLET, FILM COATED ORAL DAILY
Qty: 90 TABLET | Refills: 0 | Status: SHIPPED | OUTPATIENT
Start: 2023-09-07

## 2023-09-07 RX ORDER — SUMATRIPTAN 50 MG/1
50 TABLET, FILM COATED ORAL PRN
Qty: 40 TABLET | Refills: 0 | Status: SHIPPED | OUTPATIENT
Start: 2023-09-07 | End: 2023-10-07

## 2023-09-07 SDOH — ECONOMIC STABILITY - INCOME SECURITY: OTHER PROBLEMS RELATED TO HOUSING AND ECONOMIC CIRCUMSTANCES: Z59.89

## 2023-09-07 ASSESSMENT — ENCOUNTER SYMPTOMS
SORE THROAT: 0
EYE WATERING: 0
NAUSEA: 1
VISUAL CHANGE: 0
VOMITING: 0
BLURRED VISION: 0
ABDOMINAL PAIN: 0
SINUS PRESSURE: 0
EYE PAIN: 0
PHOTOPHOBIA: 1
EYE REDNESS: 0

## 2023-09-07 NOTE — PROGRESS NOTES
contributing to the chronic migraines. Increased dose of Propranolol sent to pharmacy. Advise to not take abortive medication for migraines >10 days/month. F/u w/ neurology to consider other preventative treatments. Discussed CGRPs and Botox today but pt declines CGRP due to cost. Pt uninsured. 2. Benign prostatic hyperplasia with nocturia  -     finasteride (PROSCAR) 5 MG tablet; Take 1 tablet by mouth daily, Disp-90 tablet, R-0Normal  -     tamsulosin (FLOMAX) 0.4 MG capsule; Take 1 capsule by mouth in the morning and at bedtime, Disp-180 capsule, R-0Normal  Pt requesting refills sent to new pharmacy. F/u w/ urology as advised. 3. Uninsured    Return in about 2 weeks (around 9/21/2023).       TRAN Jones - NP

## 2023-09-21 ENCOUNTER — OFFICE VISIT (OUTPATIENT)
Dept: PRIMARY CARE CLINIC | Facility: CLINIC | Age: 48
End: 2023-09-21

## 2023-09-21 VITALS
WEIGHT: 167 LBS | TEMPERATURE: 99.1 F | HEART RATE: 98 BPM | OXYGEN SATURATION: 97 % | RESPIRATION RATE: 18 BRPM | HEIGHT: 66 IN | DIASTOLIC BLOOD PRESSURE: 74 MMHG | SYSTOLIC BLOOD PRESSURE: 119 MMHG | BODY MASS INDEX: 26.84 KG/M2

## 2023-09-21 DIAGNOSIS — G43.719 INTRACTABLE CHRONIC MIGRAINE WITHOUT AURA AND WITHOUT STATUS MIGRAINOSUS: ICD-10-CM

## 2023-09-21 PROCEDURE — 99213 OFFICE O/P EST LOW 20 MIN: CPT | Performed by: NURSE PRACTITIONER

## 2023-09-21 RX ORDER — SUMATRIPTAN 50 MG/1
50 TABLET, FILM COATED ORAL PRN
Qty: 40 TABLET | Refills: 0 | Status: SHIPPED | OUTPATIENT
Start: 2023-09-21 | End: 2023-10-21

## 2023-09-21 RX ORDER — ONDANSETRON 4 MG/1
4 TABLET, ORALLY DISINTEGRATING ORAL EVERY 12 HOURS PRN
Qty: 14 TABLET | Refills: 0 | Status: SHIPPED | OUTPATIENT
Start: 2023-09-21

## 2023-09-21 ASSESSMENT — ENCOUNTER SYMPTOMS
ABDOMINAL PAIN: 0
SHORTNESS OF BREATH: 0
NAUSEA: 1
VOMITING: 0

## 2023-09-21 NOTE — PROGRESS NOTES
or return, may repeat dose  after 2 hours. Maximum dose: 100 mg/dose; 200 mg per 24 hours. , Disp-40 tablet, R-0Normal  -     ondansetron (ZOFRAN-ODT) 4 MG disintegrating tablet; Take 1 tablet by mouth every 12 hours as needed for Nausea or Vomiting, Disp-14 tablet, R-0Normal   Discussed in detail migraine prevention strategies including magnesium, Coq10, melatonin, Riboflavin. Advise to avoid extra caffeine as pt has been taking for prevention. Advise adjunct therapy including adequate stress management, sleep, caloric intake, hydration. Discussed medication overuse headaches and need to reduce use of ergotamines which could be contributing to the HA. I advise to use Imitrex, reviewed administration. Continue Propranolol. Would like to try another preventative strategy but pt declines several options due to financial conerns. Would appreciate neurology input. Will see pt after MRI unless sooner if indicated. Return in about 2 weeks (around 10/5/2023) for follow-up.       Purvi Oseguera, TRAN - NP

## 2023-10-04 ENCOUNTER — HOSPITAL ENCOUNTER (OUTPATIENT)
Dept: MRI IMAGING | Age: 48
Discharge: HOME | End: 2023-10-07

## 2023-10-04 DIAGNOSIS — G43.719 INTRACTABLE CHRONIC MIGRAINE WITHOUT AURA AND WITHOUT STATUS MIGRAINOSUS: ICD-10-CM

## 2023-10-04 PROCEDURE — 6360000004 HC RX CONTRAST MEDICATION: Performed by: NURSE PRACTITIONER

## 2023-10-04 PROCEDURE — A9579 GAD-BASE MR CONTRAST NOS,1ML: HCPCS | Performed by: NURSE PRACTITIONER

## 2023-10-04 PROCEDURE — 70553 MRI BRAIN STEM W/O & W/DYE: CPT

## 2023-10-04 PROCEDURE — 2580000003 HC RX 258: Performed by: NURSE PRACTITIONER

## 2023-10-04 RX ORDER — SODIUM CHLORIDE 0.9 % (FLUSH) 0.9 %
20 SYRINGE (ML) INJECTION AS NEEDED
Status: DISCONTINUED | OUTPATIENT
Start: 2023-10-04 | End: 2023-10-08 | Stop reason: HOSPADM

## 2023-10-04 RX ADMIN — GADOTERIDOL 14 ML: 279.3 INJECTION, SOLUTION INTRAVENOUS at 17:41

## 2023-10-04 RX ADMIN — SODIUM CHLORIDE, PRESERVATIVE FREE 20 ML: 5 INJECTION INTRAVENOUS at 17:41

## 2023-10-19 ENCOUNTER — OFFICE VISIT (OUTPATIENT)
Dept: NEUROLOGY | Age: 48
End: 2023-10-19

## 2023-10-19 DIAGNOSIS — G43.719 INTRACTABLE CHRONIC MIGRAINE WITHOUT AURA AND WITHOUT STATUS MIGRAINOSUS: ICD-10-CM

## 2023-10-19 PROCEDURE — 99204 OFFICE O/P NEW MOD 45 MIN: CPT | Performed by: NURSE PRACTITIONER

## 2023-10-19 RX ORDER — UBROGEPANT 100 MG/1
100 TABLET ORAL PRN
Qty: 16 TABLET | Refills: 3 | Status: SHIPPED | OUTPATIENT
Start: 2023-10-19

## 2023-10-19 RX ORDER — ATOGEPANT 60 MG/1
60 TABLET ORAL DAILY
Qty: 30 TABLET | Refills: 2 | Status: SHIPPED | OUTPATIENT
Start: 2023-10-19 | End: 2023-11-18

## 2023-10-19 ASSESSMENT — ENCOUNTER SYMPTOMS
VOMITING: 1
PHOTOPHOBIA: 1
NAUSEA: 1
ALLERGIC/IMMUNOLOGIC NEGATIVE: 1

## 2023-10-19 NOTE — ASSESSMENT & PLAN NOTE
Continue propanolol. Qulipta 60 mg tablets daily. Discontinue Imitrex, ineffective. Regarding the fencafen, this should not be taken within 24 hours of Imitrex. Additionally, this is not a medication that is available in [facility]. Trial of Ubrelvy 100 mg at onset of migraine. This may be repeated in 2 hours for maximum of 2 tablets in 24 hours. Avoid topamax due to history of kidney stones. Avoid additional blood pressure medications due to being on BB.

## 2023-10-19 NOTE — PROGRESS NOTES
Myrtle Scott is a 51 y.o. male who presents with headaches. CC: Headache        [employer] #[id number]    HPI:      Headaches are located left parietal > right but with increased pain it will radiate to right parietal.  They began entire life. The current frequency of headaches: since the beginning of this year, it has increased in frequency to 2x per week but has headache every day. Duration: 4 hours but has lasted up to 2 days. Severity is severe. Quality of headaches are described as pressure. Associated symptoms: photophobia, phonophobia, on occasions nausea, rare vomiting, no dizziness, no neck pain  The headaches are exacerbated by bright lights, loud noises, candies and darker carbonated drinks (coke) but he has eliminated these. Factors that relieve the headaches are Fencafen (caffeine 100mg) and ergotamine 1mg works best. Which was prescribed in [facility]. He has been taking this almost daily. Additionally, he has a script for sumatriptan. He was unaware they cannot be taken within 24 hours of each other. Ondansetron helps with nausea. Most bothersome migraine symptoms bright lights. Previous Imaging. [facility]ri Brain 10/5/2023       Propranolol was increased by PCP with no benefit. Ondansetron helps with nausea. He has not tried SNRI, No topirimate,       He has a history of asthma and previous kidney stone. P O U N D    HEADACHE    5 / 5. P ulsatile :  yes    O nset :  yes      Unilateral :  yes        N ausea / vomit :  yes         D ebility :  yes  Positive = 3 or more. For migraine type headaches. MIGRAINE  PHOBIAS :    2 / 5.                  PHOTO :  yes                  PHONO :  yes                   OSMO :  no                     TACTILE :  no                        VESTIBULO :  no        MEDICATION TRIALS:         Prophylactics/Preventives[de-identified]   Propranolol, Amitriptyline     Abortant meds[de-identified]   Fencafan, Imitrex    Family Members with headache history: Yes,

## 2024-01-02 ENCOUNTER — OFFICE VISIT (OUTPATIENT)
Dept: UROLOGY | Age: 49
End: 2024-01-02

## 2024-01-02 DIAGNOSIS — R35.1 BENIGN PROSTATIC HYPERPLASIA WITH NOCTURIA: ICD-10-CM

## 2024-01-02 DIAGNOSIS — R97.20 ELEVATED PSA: Primary | ICD-10-CM

## 2024-01-02 DIAGNOSIS — N40.1 BENIGN PROSTATIC HYPERPLASIA WITH NOCTURIA: ICD-10-CM

## 2024-01-02 LAB
BILIRUBIN, URINE, POC: NEGATIVE
BLOOD URINE, POC: NEGATIVE
GLUCOSE URINE, POC: NEGATIVE
KETONES, URINE, POC: NEGATIVE
LEUKOCYTE ESTERASE, URINE, POC: NEGATIVE
NITRITE, URINE, POC: NEGATIVE
PH, URINE, POC: 7 (ref 4.6–8)
PROTEIN,URINE, POC: NEGATIVE
PSA SERPL-MCNC: 5.7 NG/ML
SPECIFIC GRAVITY, URINE, POC: 1.01 (ref 1–1.03)
URINALYSIS CLARITY, POC: NORMAL
URINALYSIS COLOR, POC: NORMAL
UROBILINOGEN, POC: NORMAL

## 2024-01-02 PROCEDURE — 81003 URINALYSIS AUTO W/O SCOPE: CPT | Performed by: UROLOGY

## 2024-01-02 PROCEDURE — 99213 OFFICE O/P EST LOW 20 MIN: CPT | Performed by: UROLOGY

## 2024-01-02 RX ORDER — FINASTERIDE 5 MG/1
5 TABLET, FILM COATED ORAL DAILY
Qty: 90 TABLET | Refills: 3 | Status: SHIPPED | OUTPATIENT
Start: 2024-01-02

## 2024-01-02 ASSESSMENT — ENCOUNTER SYMPTOMS: BACK PAIN: 0

## 2024-01-02 NOTE — PROGRESS NOTES
Age of Onset    Pancreatic Cancer Mother     Heart Attack Father     Cancer Brother        Review of Systems  Constitutional:   Negative for fever.  Genitourinary:  Negative for hematuria.  Musculoskeletal:  Negative for back pain.      Urinalysis  UA - Dipstick  Results for orders placed or performed in visit on 01/02/24   AMB POC URINALYSIS DIP STICK AUTO W/O MICRO   Result Value Ref Range    Color (UA POC)      Clarity (UA POC)      Glucose, Urine, POC Negative     Bilirubin, Urine, POC Negative     KETONES, Urine, POC Negative     Specific Gravity, Urine, POC 1.015 1.001 - 1.035    Blood (UA POC) Negative     pH, Urine, POC 7.0 4.6 - 8.0    Protein, Urine, POC Negative     Urobilinogen, POC 0.2 mg/dL     Nitrite, Urine, POC Negative     Leukocyte Esterase, Urine, POC Negative        There were no vitals taken for this visit.     GENERAL: NAD, ALERT, A&O x 3, GAIT NORMAL  LUNGS: easy work of breathing  ABDOMEN: soft, non tender  NEUROLOGIC: cranial nerves 2-12 grossly intact           Assessment and Plan    ICD-10-CM    1. Elevated PSA  R97.20 AMB POC URINALYSIS DIP STICK AUTO W/O MICRO     PSA, Diagnostic      2. Benign prostatic hyperplasia with nocturia  N40.1 AMB POC URINALYSIS DIP STICK AUTO W/O MICRO    R35.1 finasteride (PROSCAR) 5 MG tablet          He will continue tamsulosin bid and finasteride daily.  Pt. will follow up in 6 months with psa for ANGELA.     PSA was drawn today.    I have spent 20 minutes today reviewing previous notes, test results and face to face with the patient as well as documenting.

## 2024-01-25 ENCOUNTER — OFFICE VISIT (OUTPATIENT)
Dept: PRIMARY CARE CLINIC | Facility: CLINIC | Age: 49
End: 2024-01-25

## 2024-01-25 VITALS
HEIGHT: 70 IN | WEIGHT: 157 LBS | RESPIRATION RATE: 18 BRPM | SYSTOLIC BLOOD PRESSURE: 121 MMHG | BODY MASS INDEX: 22.48 KG/M2 | HEART RATE: 77 BPM | TEMPERATURE: 99.1 F | DIASTOLIC BLOOD PRESSURE: 72 MMHG | OXYGEN SATURATION: 98 %

## 2024-01-25 DIAGNOSIS — R11.0 NAUSEA: ICD-10-CM

## 2024-01-25 DIAGNOSIS — R19.7 DIARRHEA, UNSPECIFIED TYPE: ICD-10-CM

## 2024-01-25 DIAGNOSIS — G43.719 INTRACTABLE CHRONIC MIGRAINE WITHOUT AURA AND WITHOUT STATUS MIGRAINOSUS: ICD-10-CM

## 2024-01-25 DIAGNOSIS — K21.9 GASTROESOPHAGEAL REFLUX DISEASE, UNSPECIFIED WHETHER ESOPHAGITIS PRESENT: ICD-10-CM

## 2024-01-25 DIAGNOSIS — K59.00 CONSTIPATION, UNSPECIFIED CONSTIPATION TYPE: Primary | ICD-10-CM

## 2024-01-25 PROCEDURE — 99213 OFFICE O/P EST LOW 20 MIN: CPT | Performed by: NURSE PRACTITIONER

## 2024-01-25 RX ORDER — PANTOPRAZOLE SODIUM 20 MG/1
20 TABLET, DELAYED RELEASE ORAL
Qty: 30 TABLET | Refills: 1 | Status: SHIPPED | OUTPATIENT
Start: 2024-01-25

## 2024-01-25 RX ORDER — MAGNESIUM 30 MG
30 TABLET ORAL DAILY
COMMUNITY

## 2024-01-25 ASSESSMENT — ENCOUNTER SYMPTOMS
COUGH: 0
DIARRHEA: 1
ABDOMINAL PAIN: 0
NAUSEA: 1
VOMITING: 1
BLOOD IN STOOL: 0
HEARTBURN: 1
SORE THROAT: 0
CONSTIPATION: 1

## 2024-01-25 ASSESSMENT — PATIENT HEALTH QUESTIONNAIRE - PHQ9
SUM OF ALL RESPONSES TO PHQ QUESTIONS 1-9: 0
1. LITTLE INTEREST OR PLEASURE IN DOING THINGS: 0
SUM OF ALL RESPONSES TO PHQ QUESTIONS 1-9: 0
SUM OF ALL RESPONSES TO PHQ QUESTIONS 1-9: 0
2. FEELING DOWN, DEPRESSED OR HOPELESS: 0
SUM OF ALL RESPONSES TO PHQ QUESTIONS 1-9: 0
SUM OF ALL RESPONSES TO PHQ9 QUESTIONS 1 & 2: 0

## 2024-01-25 NOTE — PROGRESS NOTES
Ramin Helton (: 1975) Interpretation provided by [facility] #[id number]    A few days ago had constipation and then after passed began having diarrhea. Pt states he has been having trouble gaining weight. According to chat pt has lost 5 lbs since 10/4/23. Constipation began 3 months ago. Denies symptoms occurring when Quilpta or Ubrelvy was prescribed. Constipation has resolved. Still has loose stool, about 1x/week. Sometimes it will be about 3 days between bowel movements. Denies recent travel. Denies precipitating events. Denies sick contacts. States acid reflux is worse at night. Symptoms have improved. Denies pain but describes sensation as cramping. Pain does not radiate. Has stopped eating bread. Has been treating with Tums and magnesium.     Migraines are improved, pt states from the cold. Was unable to afford the Quilpta and Ubrelvy. Was given samples for 20 days that patient took but did not continue after due to cost. States he discussed w/ neurology and is planning to make a plan at his next appt 24. Taking Propranolol daily and fencafen. Has migraine every day but the intensity is less but states last migraine was this Tuesday when pt received bad news.     GERD  Has tried TUMs without relief. Has received tx in the last for GERD.    GI Problem  The primary symptoms include weight loss, nausea, vomiting and diarrhea. Primary symptoms do not include fever or abdominal pain. The illness began more than 7 days ago.   The illness is also significant for constipation. The illness does not include chills. Significant associated medical issues include GERD.   Heartburn  He complains of heartburn and nausea. He reports no abdominal pain, no chest pain, no coughing or no sore throat. Associated symptoms include weight loss. Risk factors: denies etoh use.        Chief Complaint   Patient presents with    GI Problem     Patient presents for GI issues, patient states he noticed some

## 2024-02-19 ENCOUNTER — HOSPITAL ENCOUNTER (OUTPATIENT)
Dept: GENERAL RADIOLOGY | Age: 49
Discharge: HOME | End: 2024-02-22

## 2024-02-19 DIAGNOSIS — K59.00 CONSTIPATION, UNSPECIFIED CONSTIPATION TYPE: ICD-10-CM

## 2024-02-19 DIAGNOSIS — R19.7 DIARRHEA, UNSPECIFIED TYPE: ICD-10-CM

## 2024-02-19 PROCEDURE — 74018 RADEX ABDOMEN 1 VIEW: CPT

## 2024-02-22 ENCOUNTER — OFFICE VISIT (OUTPATIENT)
Dept: PRIMARY CARE CLINIC | Facility: CLINIC | Age: 49
End: 2024-02-22

## 2024-02-22 VITALS
SYSTOLIC BLOOD PRESSURE: 119 MMHG | BODY MASS INDEX: 25.33 KG/M2 | HEIGHT: 66 IN | DIASTOLIC BLOOD PRESSURE: 82 MMHG | WEIGHT: 157.6 LBS | HEART RATE: 102 BPM | TEMPERATURE: 98.1 F | RESPIRATION RATE: 16 BRPM

## 2024-02-22 DIAGNOSIS — K59.00 CONSTIPATION, UNSPECIFIED CONSTIPATION TYPE: Primary | ICD-10-CM

## 2024-02-22 DIAGNOSIS — R11.0 NAUSEA: ICD-10-CM

## 2024-02-22 DIAGNOSIS — K21.9 GASTROESOPHAGEAL REFLUX DISEASE, UNSPECIFIED WHETHER ESOPHAGITIS PRESENT: ICD-10-CM

## 2024-02-22 DIAGNOSIS — Z12.11 SCREENING FOR COLON CANCER: ICD-10-CM

## 2024-02-22 PROCEDURE — 99213 OFFICE O/P EST LOW 20 MIN: CPT | Performed by: NURSE PRACTITIONER

## 2024-02-22 RX ORDER — ONDANSETRON 4 MG/1
4 TABLET, ORALLY DISINTEGRATING ORAL EVERY 12 HOURS PRN
Qty: 14 TABLET | Refills: 0 | Status: SHIPPED | OUTPATIENT
Start: 2024-02-22

## 2024-02-22 ASSESSMENT — ENCOUNTER SYMPTOMS
CONSTIPATION: 1
NAUSEA: 1
DIARRHEA: 0
SHORTNESS OF BREATH: 0
BLOOD IN STOOL: 0
ABDOMINAL PAIN: 0
VOMITING: 0

## 2024-02-22 NOTE — PROGRESS NOTES
Ramin Helton (: 1975) Interpretation provided by [facility] #[id number]    Patient presents for follow up today for abdominal pain and abdominal xray results. Has continued with some abdominal symptoms. Has had constipation especially worse on the weekends. Has not taken Metamucil or Protonix as pt was unsure if to take Protonix when fasting or not and Metamucil was not given from pharmacy when pt went to  medication. Taking magnesium citrate daily for past 1 month for migraine prevention. The magnesium has helped prevent constipation. Has continued to have some acid reflux. Bowel movements are occurring every day since starting magnesium, was previously every 3-4 days. Abdominal pain has resolved but now has low back pain. Back pain is \"worse when I has gastritis or constipation\". Pain occurs if pt has not had BM in 2-3 days. Denies back injury.     Abdominal Pain  This is a recurrent problem. The pain is at a severity of 0/10. Associated symptoms include constipation, frequency (at night) and nausea. Pertinent negatives include no diarrhea, dysuria, fever, hematuria or vomiting.        Chief Complaint   Patient presents with   • Follow-up     Review xray of abdomen         Reviewed and updated this visit by provider:  Tobacco  Allergies  Meds  Problems  Med Hx  Surg Hx  Fam Hx           Immunizations:  Immunization status: up to date and documented.    Review of Systems:   Review of Systems   Constitutional:  Negative for chills, fever and unexpected weight change.   Respiratory:  Negative for shortness of breath.    Cardiovascular:  Negative for chest pain.   Gastrointestinal:  Positive for constipation and nausea. Negative for abdominal pain, blood in stool, diarrhea and vomiting.   Genitourinary:  Positive for frequency (at night). Negative for dysuria, hematuria and urgency.        /82 (Site: Right Upper Arm, Position: Sitting, Cuff Size: Medium Adult)   Pulse (!)

## 2024-02-22 NOTE — PROGRESS NOTES
Patient came in for a follow up of his xray of the abdomen. When asked if he is still experiencing headaches he said that he has but not as much as before. Patient says he thinks is because he has recently suffered the loss of his favorite pet in [facility]. He also think that worrying about the loss has made him have some stomach pains due to the stress of thinking about the loss of his animal.

## 2024-05-09 ENCOUNTER — OFFICE VISIT (OUTPATIENT)
Dept: PRIMARY CARE CLINIC | Facility: CLINIC | Age: 49
End: 2024-05-09

## 2024-05-09 VITALS
OXYGEN SATURATION: 98 % | HEART RATE: 83 BPM | BODY MASS INDEX: 25.39 KG/M2 | DIASTOLIC BLOOD PRESSURE: 72 MMHG | RESPIRATION RATE: 18 BRPM | HEIGHT: 66 IN | TEMPERATURE: 98.7 F | SYSTOLIC BLOOD PRESSURE: 121 MMHG | WEIGHT: 158 LBS

## 2024-05-09 DIAGNOSIS — B35.9 TINEA: ICD-10-CM

## 2024-05-09 DIAGNOSIS — B35.1 ONYCHOMYCOSIS: ICD-10-CM

## 2024-05-09 DIAGNOSIS — K21.9 GASTROESOPHAGEAL REFLUX DISEASE, UNSPECIFIED WHETHER ESOPHAGITIS PRESENT: ICD-10-CM

## 2024-05-09 DIAGNOSIS — R63.4 WEIGHT LOSS, UNINTENTIONAL: Primary | ICD-10-CM

## 2024-05-09 PROCEDURE — 99214 OFFICE O/P EST MOD 30 MIN: CPT | Performed by: NURSE PRACTITIONER

## 2024-05-09 RX ORDER — CLOTRIMAZOLE 1 %
CREAM (GRAM) TOPICAL
Qty: 60 G | Refills: 0 | Status: SHIPPED | OUTPATIENT
Start: 2024-05-09 | End: 2024-05-16

## 2024-05-09 RX ORDER — CLOTRIMAZOLE 1 %
CREAM (GRAM) TOPICAL
Qty: 60 G | Refills: 1 | Status: SHIPPED | OUTPATIENT
Start: 2024-05-09 | End: 2024-05-09

## 2024-05-09 RX ORDER — PANTOPRAZOLE SODIUM 20 MG/1
20 TABLET, DELAYED RELEASE ORAL
Qty: 30 TABLET | Refills: 1 | Status: SHIPPED | OUTPATIENT
Start: 2024-05-09

## 2024-05-09 RX ORDER — CICLOPIROX 80 MG/ML
SOLUTION TOPICAL
Qty: 1 EACH | Refills: 3 | Status: SHIPPED | OUTPATIENT
Start: 2024-05-09

## 2024-05-09 ASSESSMENT — ENCOUNTER SYMPTOMS
VOMITING: 0
ABDOMINAL PAIN: 0
CONSTIPATION: 1
BLOOD IN STOOL: 0
NAUSEA: 0
SHORTNESS OF BREATH: 1
DIARRHEA: 0
COUGH: 0
WHEEZING: 0

## 2024-05-09 NOTE — PROGRESS NOTES
Ramin Helton (:  1975) is a 48 y.o. male here for evaluation of the following chief complaint(s):  Chief Complaint   Patient presents with    Weight Management     Patient presents for weigh management, pt states he's been having difficulty gaining weight.  Hematoma in the in both legs, he's not sure why they appear, doesn't hurt but itches, he uses hydrocortisone spray 1% but it doesn't help much, it appeared about months ago    Medication Refill     Patient needs rf on Pantoprazole        Reviewed and updated this visit by provider:  Tobacco  Allergies  Meds  Problems  Med Hx  Surg Hx  Fam Hx         Interpretation provided by [facility] #[id number]    Patient presents today for weight gain, patient desires to gain weight. Today weight is 158lbs (71.7 kg.) Weight was 168 lbs in 2023 upon chart review. Has been using protein supplementation the last 2 months. Has been eating more eggs. States his normal weight is 80 kg. Lifts a lot of heavy things and walks a lot for work, works as a . Diet recall - Dinner= rice with eggs and tea, breakfast= arepa, lunch= rice, potatoes, and salad. Nausea has resolved.    Hematoma   2 years ago had an infection patient stated he had an infection from some clothes he treated with an antifungal and it went away. This came back 2 months ago. Treating with hydrocortisone 1% spray. Area is itchy. Began on right leg and now has symptoms on the left leg. Denies pain. Does have t    Weight Management  Pertinent negatives include no abdominal pain, chest pain, chills, coughing, fever, nausea or vomiting.   Medication Refill  Pertinent negatives include no abdominal pain, chest pain, chills, coughing, fever, nausea or vomiting.        Immunizations:  Immunization status: up to date and documented.    Review of Systems:   Review of Systems   Constitutional:  Negative for chills and fever.   Respiratory:  Positive for shortness of breath (with allergies

## 2024-07-11 ENCOUNTER — TELEPHONE (OUTPATIENT)
Dept: UROLOGY | Age: 49
End: 2024-07-11

## 2024-07-11 DIAGNOSIS — R35.1 BENIGN PROSTATIC HYPERPLASIA WITH NOCTURIA: ICD-10-CM

## 2024-07-11 DIAGNOSIS — N40.1 BENIGN PROSTATIC HYPERPLASIA WITH NOCTURIA: ICD-10-CM

## 2024-07-11 RX ORDER — TAMSULOSIN HYDROCHLORIDE 0.4 MG/1
0.4 CAPSULE ORAL 2 TIMES DAILY
Qty: 180 CAPSULE | Refills: 0 | Status: SHIPPED | OUTPATIENT
Start: 2024-07-11

## 2024-10-18 ENCOUNTER — TELEPHONE (OUTPATIENT)
Dept: UROLOGY | Age: 49
End: 2024-10-18

## 2024-10-18 NOTE — TELEPHONE ENCOUNTER
Pt called in requesting to reschedule from 10/29/2024 to 01/09/2024 due to issues with medication.

## 2025-01-02 ENCOUNTER — LAB (OUTPATIENT)
Dept: UROLOGY | Age: 50
End: 2025-01-02

## 2025-01-02 DIAGNOSIS — R97.20 ELEVATED PSA: ICD-10-CM

## 2025-01-02 LAB — PSA SERPL-MCNC: 8.2 NG/ML (ref 0–4)

## 2025-01-09 ENCOUNTER — OFFICE VISIT (OUTPATIENT)
Dept: UROLOGY | Age: 50
End: 2025-01-09

## 2025-01-09 DIAGNOSIS — R35.1 BENIGN PROSTATIC HYPERPLASIA WITH NOCTURIA: Primary | ICD-10-CM

## 2025-01-09 DIAGNOSIS — R97.20 ELEVATED PSA: ICD-10-CM

## 2025-01-09 DIAGNOSIS — N40.1 BENIGN PROSTATIC HYPERPLASIA WITH NOCTURIA: Primary | ICD-10-CM

## 2025-01-09 LAB
BILIRUBIN, URINE, POC: NEGATIVE
BLOOD URINE, POC: NORMAL
GLUCOSE URINE, POC: NEGATIVE MG/DL
KETONES, URINE, POC: NEGATIVE MG/DL
LEUKOCYTE ESTERASE, URINE, POC: NEGATIVE
NITRITE, URINE, POC: NEGATIVE
PH, URINE, POC: 6 (ref 4.6–8)
PROTEIN,URINE, POC: NORMAL MG/DL
SPECIFIC GRAVITY, URINE, POC: 1.02 (ref 1–1.03)
URINALYSIS CLARITY, POC: NORMAL
URINALYSIS COLOR, POC: NORMAL
UROBILINOGEN, POC: NORMAL MG/DL

## 2025-01-09 PROCEDURE — 99214 OFFICE O/P EST MOD 30 MIN: CPT | Performed by: UROLOGY

## 2025-01-09 PROCEDURE — 81003 URINALYSIS AUTO W/O SCOPE: CPT | Performed by: UROLOGY

## 2025-01-09 RX ORDER — FINASTERIDE 5 MG/1
5 TABLET, FILM COATED ORAL DAILY
Qty: 90 TABLET | Refills: 3 | Status: CANCELLED | OUTPATIENT
Start: 2025-01-09

## 2025-01-09 RX ORDER — TAMSULOSIN HYDROCHLORIDE 0.4 MG/1
0.4 CAPSULE ORAL 2 TIMES DAILY
Qty: 180 CAPSULE | Refills: 0 | Status: CANCELLED | OUTPATIENT
Start: 2025-01-09

## 2025-01-09 RX ORDER — FINASTERIDE 5 MG/1
5 TABLET, FILM COATED ORAL DAILY
Qty: 90 TABLET | Refills: 3 | Status: SHIPPED | OUTPATIENT
Start: 2025-01-09

## 2025-01-09 RX ORDER — ALFUZOSIN HYDROCHLORIDE 10 MG/1
10 TABLET, EXTENDED RELEASE ORAL DAILY
Qty: 30 TABLET | Refills: 3 | Status: SHIPPED | OUTPATIENT
Start: 2025-01-09

## 2025-01-09 ASSESSMENT — ENCOUNTER SYMPTOMS: BACK PAIN: 0

## 2025-01-09 NOTE — PROGRESS NOTES
[facility]  [address]  [place] [address]  [phone]          Ramin Helton  : 1975    Chief Complaint   Patient presents with    Follow-up     yearly          HPI     Ramin Helton is a 49 y.o. male followed for BPH and elevated psa.  Micronesian speaking only.   used today.       Upon chart review, pt has had 3 ER visit in  at [facility] and 1 ER visit in [place] In July for urinary retention. He was started on keflex on  for nitritie positive UA and flomax.  Urine was not sent for culture. He reports he has failed 3 VT. Cr 1.06 on 22. No imaging of urinary tract on file.     Pt passed VT on 22.  PVR 39 cc via US in office today. UA clear. He was continued on flomax 0.4 mg BID.      Grandfather was dx w prostate Ca. He is a non smoker.      MRI prostate 23 revealed PIRADS 3 lesions x 2, 1 PIRADS 4 lesion, 1 PIRADS 5 lesion.  Uronav guided prostate biopsy 23 revealed NO prostate cancer.  All cores were benign.  Gland was 66 gr with a median lobe.       Finasteride was started in  and LUTS have improved.      PSA:  7.8, 10/22 9.4,  9.0,  5.7,  8.2          Past Medical History:   Diagnosis Date    Asthma     no recent episodes    Benign prostatic hyperplasia     Catheter (urine) change required     pt no longer has a catheter    Elevated prostate specific antigen (PSA)     Migraines     Seasonal allergies     Uses Micronesian as primary spoken language      Past Surgical History:   Procedure Laterality Date    CATHETER INSERTION      placed and removed    HERNIA REPAIR Left     in the testicles    PROSTATE BIOPSY N/A 2023    PROSTATE BIOPSY FUSION performed by Ayden Up MD at [facility] MAIN OR    TONSILLECTOMY       Current Outpatient Medications   Medication Sig Dispense Refill    finasteride (PROSCAR) 5 MG tablet Take 1 tablet by mouth daily 90 tablet 3    alfuzosin

## 2025-02-10 DIAGNOSIS — R35.1 BENIGN PROSTATIC HYPERPLASIA WITH NOCTURIA: ICD-10-CM

## 2025-02-10 DIAGNOSIS — N40.1 BENIGN PROSTATIC HYPERPLASIA WITH NOCTURIA: ICD-10-CM

## 2025-02-10 RX ORDER — ALFUZOSIN HYDROCHLORIDE 10 MG/1
10 TABLET, EXTENDED RELEASE ORAL DAILY
Qty: 30 TABLET | Refills: 3 | Status: SHIPPED | OUTPATIENT
Start: 2025-02-10

## 2025-05-13 DIAGNOSIS — N40.1 BENIGN PROSTATIC HYPERPLASIA WITH NOCTURIA: ICD-10-CM

## 2025-05-13 DIAGNOSIS — R35.1 BENIGN PROSTATIC HYPERPLASIA WITH NOCTURIA: ICD-10-CM

## 2025-05-13 RX ORDER — FINASTERIDE 5 MG/1
5 TABLET, FILM COATED ORAL DAILY
Qty: 90 TABLET | Refills: 3 | OUTPATIENT
Start: 2025-05-13

## 2025-05-13 RX ORDER — ALFUZOSIN HYDROCHLORIDE 10 MG/1
10 TABLET, EXTENDED RELEASE ORAL DAILY
Qty: 30 TABLET | Refills: 3 | OUTPATIENT
Start: 2025-05-13

## 2025-07-07 ENCOUNTER — LAB (OUTPATIENT)
Dept: UROLOGY | Age: 50
End: 2025-07-07

## 2025-07-07 DIAGNOSIS — R97.20 ELEVATED PSA: ICD-10-CM

## 2025-07-07 LAB — PSA SERPL-MCNC: 5.7 NG/ML (ref 0–4)

## 2025-07-14 ENCOUNTER — OFFICE VISIT (OUTPATIENT)
Dept: UROLOGY | Age: 50
End: 2025-07-14

## 2025-07-14 DIAGNOSIS — N40.1 BENIGN PROSTATIC HYPERPLASIA WITH NOCTURIA: Primary | ICD-10-CM

## 2025-07-14 DIAGNOSIS — R35.1 BENIGN PROSTATIC HYPERPLASIA WITH NOCTURIA: Primary | ICD-10-CM

## 2025-07-14 DIAGNOSIS — R97.20 ELEVATED PSA: ICD-10-CM

## 2025-07-14 LAB
BILIRUBIN, URINE, POC: NEGATIVE
BLOOD URINE, POC: NEGATIVE
GLUCOSE URINE, POC: NEGATIVE MG/DL
KETONES, URINE, POC: NEGATIVE MG/DL
LEUKOCYTE ESTERASE, URINE, POC: NEGATIVE
NITRITE, URINE, POC: NEGATIVE
PH, URINE, POC: 6.5 (ref 4.6–8)
PROTEIN,URINE, POC: NEGATIVE MG/DL
SPECIFIC GRAVITY, URINE, POC: 1.01 (ref 1–1.03)
URINALYSIS CLARITY, POC: NORMAL
URINALYSIS COLOR, POC: NORMAL
UROBILINOGEN, POC: NORMAL MG/DL

## 2025-07-14 PROCEDURE — 99214 OFFICE O/P EST MOD 30 MIN: CPT | Performed by: UROLOGY

## 2025-07-14 PROCEDURE — 81003 URINALYSIS AUTO W/O SCOPE: CPT | Performed by: UROLOGY

## 2025-07-14 RX ORDER — FINASTERIDE 5 MG/1
5 TABLET, FILM COATED ORAL EVERY OTHER DAY
Qty: 45 TABLET | Refills: 3 | Status: SHIPPED | OUTPATIENT
Start: 2025-07-14

## 2025-07-14 RX ORDER — ALFUZOSIN HYDROCHLORIDE 10 MG/1
10 TABLET, EXTENDED RELEASE ORAL DAILY
Qty: 30 TABLET | Refills: 3 | Status: SHIPPED | OUTPATIENT
Start: 2025-07-14

## 2025-07-14 NOTE — PROGRESS NOTES
Financial Resource Strain (CARDIA)     Difficulty of Paying Living Expenses: Not hard at all   Food Insecurity: Not on file (6/22/2023)   Transportation Needs: Unknown (6/22/2023)    PRAPARE - Transportation     Lack of Transportation (Medical): Not on file     Lack of Transportation (Non-Medical): No   Physical Activity: Not on file   Stress: Not on file   Social Connections: Unknown (5/14/2024)    Received from [facility] Short Social Needs Screening - Social Connection     Would you like help with any of the following needs: food, medicine/medical supplies, transportation, loneliness, housing or utilities?: Not on file   Intimate Partner Violence: Not on file   Housing Stability: Unknown (6/22/2023)    Housing Stability Vital Sign     Unable to Pay for Housing in the Last Year: Not on file     Number of Places Lived in the Last Year: Not on file     Unstable Housing in the Last Year: No     Family History   Problem Relation Age of Onset    Pancreatic Cancer Mother     Heart Attack Father     Cancer Brother        Review of Systems  Genitourinary: Positive for frequent urination. Negative for hematuria and testicular pain.  Musculoskeletal:  Negative for bone pain.      Urinalysis  UA - Dipstick  Results for orders placed or performed in visit on 07/14/25   AMB POC URINALYSIS DIP STICK AUTO W/O MICRO    Collection Time: 07/14/25  4:18 PM   Result Value Ref Range    Color (UA POC)      Clarity (UA POC)      Glucose, Urine, POC Negative Negative mg/dL    Bilirubin, Urine, POC Negative Negative    KETONES, Urine, POC Negative Negative mg/dL    Specific Gravity, Urine, POC 1.010 1.001 - 1.035    Blood (UA POC) Negative     pH, Urine, POC 6.5 4.6 - 8.0    Protein, Urine, POC Negative Negative mg/dL    Urobilinogen, POC 0.2 mg/dL <1.1 mg/dL    Nitrite, Urine, POC Negative Negative    Leukocyte Esterase, Urine, POC Negative Negative       There were no vitals taken for this visit.     GENERAL: NAD, ALERT, A&O x

## 2025-07-14 NOTE — CONSULTS
Session ID: [id number]  Session Duration: 10 minutes  Language: Sammarinese   ID: #[id number]   Name: Vidal

## (undated) DEVICE — MAX-CORE® DISPOSABLE CORE BIOPSY INSTRUMENT, 18G X 25CM: Brand: MAX-CORE

## (undated) DEVICE — HOLDER PRB URONAV

## (undated) DEVICE — STERILE PACKED. BORE DIAMETER 1.6 MM; ANGLE OF INSERTION 19° TO THE LONG AXIS OF THE TRANSDUCER: Brand: SINGLE-USE BIPLANE BIOPSY GUIDE

## (undated) DEVICE — DRAPE TWL SURG 16X26IN BLU ORB04] ALLCARE INC]

## (undated) DEVICE — JELLY LUBRICATING 10GM PREFIL SYR LUBE